# Patient Record
Sex: FEMALE | Race: WHITE | NOT HISPANIC OR LATINO | Employment: FULL TIME | URBAN - METROPOLITAN AREA
[De-identification: names, ages, dates, MRNs, and addresses within clinical notes are randomized per-mention and may not be internally consistent; named-entity substitution may affect disease eponyms.]

---

## 2020-11-03 ENCOUNTER — OFFICE VISIT (OUTPATIENT)
Dept: OBSTETRICS AND GYNECOLOGY | Facility: CLINIC | Age: 25
End: 2020-11-03

## 2020-11-03 VITALS
DIASTOLIC BLOOD PRESSURE: 90 MMHG | BODY MASS INDEX: 20.09 KG/M2 | TEMPERATURE: 97.5 F | SYSTOLIC BLOOD PRESSURE: 140 MMHG | WEIGHT: 125 LBS | HEIGHT: 66 IN

## 2020-11-03 DIAGNOSIS — Z00.00 ANNUAL PHYSICAL EXAM: Primary | ICD-10-CM

## 2020-11-03 PROCEDURE — 99395 PREV VISIT EST AGE 18-39: CPT | Performed by: NURSE PRACTITIONER

## 2020-11-03 RX ORDER — NORELGESTROMIN AND ETHINYL ESTRADIOL 150; 35 UG/D; UG/D
1 PATCH TRANSDERMAL WEEKLY
Qty: 3 PATCH | Refills: 12 | Status: SHIPPED | OUTPATIENT
Start: 2020-11-03 | End: 2021-01-20 | Stop reason: SDUPTHER

## 2020-11-03 RX ORDER — NORELGESTROMIN AND ETHINYL ESTRADIOL 150; 35 UG/D; UG/D
1 PATCH TRANSDERMAL WEEKLY
COMMUNITY
End: 2020-11-03

## 2020-11-03 NOTE — PROGRESS NOTES
GYN Annual Exam     CC - Here for annual exam.        HPI  Patricia Sotelo is a 25 y.o. female, No obstetric history on file., who presents for annual well woman exam. Patient's last menstrual period was 10/03/2020 (approximate)..  Periods are regular every 25-35 days, lasting 4 days. .  Dysmenorrhea:mild, occurring first 1-2 days of flow.  Patient reports problems with: none.  There were no changes to her medical or surgical history since her last visit.. Partner Status: Marital Status: single.  New Partners since last visit: no.  Desires STD Screening: no. The patient had an annual exam one year ago. Her last pap smear was one year ago and negative. She uses Xulane patch for birth control. She has no complaints today.    Additional OB/GYN History   Current contraception: contraceptive methods: xulane patch  Desires to: continue contraception  Last Pap : 2019 negative  Last Completed Pap Smear       Status Date      PAP SMEAR No completions recorded        History of abnormal Pap smear: no  Family history of uterine, colon, breast, or ovarian cancer: no  Performs monthly Self-Breast Exam: no  Exercises Regularly:yes  Feelings of Anxiety or Depression: no  Tobacco Usage?: No   OB History    No obstetric history on file.         Health Maintenance   Topic Date Due   • Annual Gynecologic Pelvic and Breast Exam  1995   • ANNUAL PHYSICAL  08/08/1998   • HPV VACCINES (1 - 2-dose series) 08/08/2006   • TDAP/TD VACCINES (1 - Tdap) 08/08/2014   • INFLUENZA VACCINE  08/01/2020   • HEPATITIS C SCREENING  09/02/2020   • PAP SMEAR  09/02/2020   • Pneumococcal Vaccine 0-64  Aged Out       The additional following portions of the patient's history were reviewed and updated as appropriate: allergies, current medications, past family history, past medical history, past social history, past surgical history and problem list.    Review of Systems   Constitutional: Negative.    Gastrointestinal: Negative.    Genitourinary:  "Negative.    Psychiatric/Behavioral: Negative.      All other systems reviewed and are negative.     I have reviewed and agree with the HPI, ROS, and historical information as entered above. RAGHAV Bowman    Objective   /90   Temp 97.5 °F (36.4 °C)   Ht 167.6 cm (66\")   Wt 56.7 kg (125 lb)   LMP 10/03/2020 (Approximate)   Breastfeeding No   BMI 20.18 kg/m²     Physical Exam  Vitals signs and nursing note reviewed. Exam conducted with a chaperone present.   Constitutional:       Appearance: Normal appearance.   Cardiovascular:      Rate and Rhythm: Normal rate and regular rhythm.   Chest:      Breasts:         Right: Normal.         Left: Normal.   Genitourinary:     General: Normal vulva.      Vagina: Normal.      Cervix: Normal.      Uterus: Normal.       Adnexa: Right adnexa normal and left adnexa normal.      Rectum: Normal.   Neurological:      Mental Status: She is alert.            Assessment and Plan    Problem List Items Addressed This Visit     None      Visit Diagnoses     Annual physical exam    -  Primary    Relevant Orders    Pap IG, Rfx HPV ASCU          1. GYN annual well woman exam.   2. Reviewed monthly self breast exams.  Instructed to call with lumps, pain, or breast discharge.    3. Reviewed exercise as a preventative health measures.   4. Reccommended Flu Vaccine in Fall of each year.  5. RTC in 1 year or PRN with problems   6. Doing well on Xulane patch. Rx refilled      RAGHAV Bowman  11/03/2020  "

## 2020-11-12 DIAGNOSIS — Z00.00 ANNUAL PHYSICAL EXAM: ICD-10-CM

## 2020-12-23 ENCOUNTER — OFFICE VISIT (OUTPATIENT)
Dept: OBSTETRICS AND GYNECOLOGY | Facility: CLINIC | Age: 25
End: 2020-12-23

## 2020-12-23 VITALS
BODY MASS INDEX: 19.84 KG/M2 | WEIGHT: 119.1 LBS | SYSTOLIC BLOOD PRESSURE: 116 MMHG | HEIGHT: 65 IN | DIASTOLIC BLOOD PRESSURE: 74 MMHG

## 2020-12-23 DIAGNOSIS — R87.612 LGSIL ON PAP SMEAR OF CERVIX: Primary | ICD-10-CM

## 2020-12-23 PROCEDURE — 57455 BIOPSY OF CERVIX W/SCOPE: CPT | Performed by: OBSTETRICS & GYNECOLOGY

## 2020-12-23 NOTE — PATIENT INSTRUCTIONS
Cervical Dysplasia    Cervical dysplasia is a condition in which a woman's cervix cells have abnormal changes. The cervix is the opening of the uterus (womb). It is located between the vagina and the uterus. Cervical dysplasia may be an early sign of cervical cancer.  If left untreated, this condition may become more severe and may progress to cervical cancer. Early detection, treatment, and follow-up care are very important.  What are the causes?  Cervical dysplasia can be caused by a human papillomavirus (HPV) infection. HPV is the most common sexually transmitted infection (STI). HPV is spread from person to person through sexual contact. This includes oral, vaginal, or anal sex.  What increases the risk?  The following factors may make you more likely to develop this condition:  · Having had a sexually transmitted infection (STI), such as herpes, chlamydia or HPV.  · Becoming sexually active before age 18.  · Having had more than one sexual partner.  · Having a sexual partner who has multiple sexual partners.  · Not using protection, such as a condom, during sex, especially with new sexual partners.  · Having a history of cancer of the vagina or vulva.  · Having a weakened body defense (immune) system.  · Being the daughter of a woman who took diethylstilbestrol (LELE) during pregnancy.  · Having a family history of cervical cancer.  · Smoking.  · Using oral contraceptives, also called birth control pills.  · Having had three or more full-term pregnancies.  What are the signs or symptoms?  There are usually no symptoms of this condition. If you do have symptoms, they may include:  · Abnormal vaginal discharge.  · Bleeding between periods or after sex.  · Bleeding during menopause.  · Pain during sex (dyspareunia).  How is this diagnosed?  A test called a Pap test may be done. During this test, cells are taken from the cervix and then examined under a microscope. A test in which tissue is removed from the cervix  (biopsy) may also be done if the Pap test is abnormal or if the cervix looks abnormal.  How is this treated?  Treatment varies based on the severity of the condition. Treatment may include:  · Cryotherapy. During cryotherapy, the abnormal cells are frozen with a steel-tip instrument.  · Loop electrosurgical excision procedure (LEEP). This procedure removes abnormal tissue from the cervix.  · Surgery to remove abnormal tissue. This is usually done in more advanced cases. Surgical options include:  ? A cone biopsy. This is a procedure in which the cervical canal and a portion of the center of the cervix are removed.  ? Hysterectomy. This is a surgery in which the uterus and cervix are removed.  Follow these instructions at home:  · Take over-the-counter and prescription medicines only as told by your health care provider.  · Do not use tampons, have sex, or douche until your health care provider says it is okay.  · Keep follow-up visits as told by your health care provider. This is important. Women who have been treated for cervical dysplasia should have regular pelvic exams and Pap tests as told by their health care provider.  How is this prevented?  · Practice safe sex to help prevent sexually transmitted infections (STI) that may cause this condition.  · Have regular Pap tests. Talk with your health care provider about how often you need these tests. Pap tests will help identify cell changes that can lead to cancer.  Contact a health care provider if:  · You develop genital warts.  Get help right away if:  · You have a fever.  · You have abnormal vaginal discharge.  · Your menstrual period is heavier than normal.  · You develop bright red bleeding. This may include blood clots.  · You have pain or cramps that get worse, and medicine does not help to relieve your pain.  · You feel light-headed and you are unusually weak.  · You have fainting spells.  · You have pain in the abdomen.  Summary  · Cervical dysplasia is  a condition in which a woman's cervix cells have abnormal changes.  · If left untreated, this condition may become more severe and may progress to cervical cancer.  · Early detection, treatment, and follow-up care are very important in managing this condition.  · Have regular pelvic exams and Pap tests. Talk with your health care provider about how often you need these tests. Pap tests will help identify cell changes that can lead to cancer.  This information is not intended to replace advice given to you by your health care provider. Make sure you discuss any questions you have with your health care provider.  Document Revised: 10/09/2019 Document Reviewed: 12/21/2017  Elsevier Patient Education © 2020 [employer]

## 2020-12-23 NOTE — PROGRESS NOTES
Colposcopy Procedure Note  Procedures    Indications: Patricia Sotelo is a 25 y.o. female, , whose LMP indicated follow up for evaluation of an abnormal papsmear presents today for colposcopy for evaluation of LSIL papsmear.  She understands the need for the procedure and is aware of the complications, including post-colposcopic vaginal bleeding, vaginal leukorrhea or cervicitis.  She is aware she may experience discomfort.  After being presented with the risk, benefits, and alternatives the patient wished to proceed.      Most recent Pap smear showed: low-grade squamous intraepithelial neoplasia (LGSIL - encompassing HPV,mild dysplasia,NAZ I).      Prior cervical/vaginal disease: normal exam without visible pathology.    Prior cervical treatment: no treatment.    Procedure Details   The risks and benefits of the procedure and Verbal informed consent obtained.  She was positioned in the dorsal lithotomy position and a speculum was inserted into the vagina and excellent visualization of cervix achieved, cervix swabbed x 3 with acetic acid solution and Lugol's solution. The transformation zone was completely visualized.  A cervical biopsy was obtained.  An endocervical curettage was not performed.  This colposcopy was satisfactory.     Findings:  The procedure was notable for:  Cervix: acetowhite lesion(s) noted at 10 o'clock; cervix swabbed with Lugol's solution, cervical biopsies taken at 10 o'clock, specimen labelled and sent to pathology and hemostasis achieved with Monsel's solution.  Vaginal inspection: vaginal colposcopy not performed.  Vulvar colposcopy: vulvar colposcopy not performed.    Specimens: cervical biopsy    Complications: none.      Plan:  1. Specimens labelled and sent to Pathology.  2. Will base further treatment on Pathology findings.  3. Repeat pap in 6 months    Wilder Betancourt MD  2020

## 2021-01-06 ENCOUNTER — TELEPHONE (OUTPATIENT)
Dept: OBSTETRICS AND GYNECOLOGY | Facility: CLINIC | Age: 26
End: 2021-01-06

## 2021-01-06 NOTE — TELEPHONE ENCOUNTER
Received biopsy results via fax : results stated LSIL , per [facility] repeat pap in 6 months.     S/w pt she v/u - scheduled 6 mo repeat pap appt.

## 2021-01-20 RX ORDER — NORELGESTROMIN AND ETHINYL ESTRADIOL 150; 35 UG/D; UG/D
1 PATCH TRANSDERMAL WEEKLY
Qty: 12 PATCH | Refills: 3 | Status: SHIPPED | OUTPATIENT
Start: 2021-01-20 | End: 2021-02-08 | Stop reason: SDUPTHER

## 2021-01-20 NOTE — TELEPHONE ENCOUNTER
Last appointment: 12/23/2020    [facility] is requesting 90 day fills be sent.

## 2021-01-26 RX ORDER — NORELGESTROMIN AND ETHINYL ESTRADIOL 150; 35 UG/D; UG/D
1 PATCH TRANSDERMAL WEEKLY
Qty: 12 PATCH | Refills: 3 | Status: CANCELLED | OUTPATIENT
Start: 2021-01-26

## 2021-01-26 RX ORDER — NORELGESTROMIN AND ETHINYL ESTRADIOL 150; 35 UG/D; UG/D
1 PATCH TRANSDERMAL WEEKLY
Qty: 9 PATCH | Refills: 3 | Status: CANCELLED | OUTPATIENT
Start: 2021-01-26 | End: 2021-04-20

## 2021-02-09 RX ORDER — NORELGESTROMIN AND ETHINYL ESTRADIOL 150; 35 UG/D; UG/D
1 PATCH TRANSDERMAL WEEKLY
Qty: 12 PATCH | Refills: 3 | Status: SHIPPED | OUTPATIENT
Start: 2021-02-09 | End: 2022-03-07 | Stop reason: SDUPTHER

## 2021-07-21 ENCOUNTER — OFFICE VISIT (OUTPATIENT)
Dept: OBSTETRICS AND GYNECOLOGY | Facility: CLINIC | Age: 26
End: 2021-07-21

## 2021-07-21 VITALS
WEIGHT: 115 LBS | BODY MASS INDEX: 18.05 KG/M2 | DIASTOLIC BLOOD PRESSURE: 64 MMHG | SYSTOLIC BLOOD PRESSURE: 102 MMHG | HEIGHT: 67 IN

## 2021-07-21 DIAGNOSIS — Z87.42 HISTORY OF ABNORMAL CERVICAL PAP SMEAR: Primary | ICD-10-CM

## 2021-07-21 PROCEDURE — 99212 OFFICE O/P EST SF 10 MIN: CPT | Performed by: OBSTETRICS & GYNECOLOGY

## 2021-07-21 NOTE — PATIENT INSTRUCTIONS
Cervical Dysplasia    Cervical dysplasia is a condition in which the cells in a woman's cervix have abnormal changes. The cervix is the opening of the uterus. It is located between the vagina and the uterus. Cervical dysplasia may be an early sign of cervical cancer.  If left untreated, this condition may become more severe and may progress to cervical cancer. Early detection, treatment, and follow-up care are very important.  What are the causes?  Cervical dysplasia is usually caused by a human papillomavirus (HPV) infection. HPV is spread from person to person through sexual contact. This includes oral, vaginal, or anal sex. HPV is the most common sexually transmitted infection (STI).  You are more likely to be exposed to HPV through sexual contact if:  · You have had more than one sexual partner or you have a sexual partner who has multiple sexual partners.  · You do not use protection such as a condom during sex, especially with new sexual partners.  What increases the risk?  The following factors may make you more likely to develop this condition:  · Having a family history of cervical cancer or a personal history of cancer of the vagina or vulva.  · Having had an STI, such as herpes, chlamydia, or gonorrhea.  · Becoming sexually active before age 18.  · Having a weakened disease-fighting system (immunesystem).  · Smoking.  · Being the daughter of a woman who took diethylstilbestrol (LELE), a synthetic estrogen, during pregnancy. Exposure during pregnancy may cause cervical abnormalities in the developing fetus.  What are the signs or symptoms?  There are usually no symptoms of this condition. If you do have symptoms, they may include:  · Abnormal vaginal discharge.  · Bleeding between periods or after sex.  · Bleeding during menopause.  · Pain during sex.  How is this diagnosed?  This condition may be diagnosed with a Pap test. During this test, cells are swabbed from the cervix and looked at under a  microscope.  If the Pap test is abnormal or if the cervix looks abnormal, you may also have a test in which a tissue sample is removed from the cervix and looked at under a microscope(biopsy).  How is this treated?  Treatment varies based on the severity of the condition. Treatment may include:  · Cryotherapy. During this therapy, the abnormal cells are frozen with a steel-tipped instrument.  · Loop electrosurgical excision procedure (LEEP). LEEP removes abnormal tissue from the cervix.  · Surgery to remove abnormal tissue. This is usually done in more severe cases. Options include:  ? A cone biopsy. This treatment removes the cervical canal and part of the center of the cervix.  ? Hysterectomy. This is a surgery in which the uterus and cervix are removed.  Follow these instructions at home:  · Take over-the-counter and prescription medicines only as told by your health care provider.  · Do not use tampons, have sex, or douche until your health care provider says it is safe.  · Keep all follow-up visits as told by your health care provider. This is important. Women who have been treated for cervical dysplasia should have regular pelvic exams and Pap tests as told by their health care provider.  How is this prevented?  · Practice safe sex to help prevent STIs.  · Have regular Pap tests. Talk with your health care provider about how often you need these tests. Pap tests will help identify cell changes that can lead to cancer.  · Ask your health care provider about possible vaccines to protect yourself against HPV.  Contact a health care provider if:  · You develop genital warts. The risk of cervical cancer is higher with certain types of HPV.  · Your menstrual period is heavier than normal or you develop bright red bleeding, which may include blood clots.  · You have abnormal vaginal discharge.  · You have a fever.  Get help right away if:  · You have pain or cramps in the abdomen that get worse, and medicine does not  help to relieve your pain.  · You feel light-headed and are unusually weak or pass out.  Summary  · Cervical dysplasia is a condition in which a woman's cervix cells have abnormal changes.  · If left untreated, this condition may become more severe and may progress to cervical cancer.  · Early detection, treatment, and follow-up care are very important in managing this condition.  · Have regular pelvic exams and Pap tests. Talk with your health care provider about how often you need these tests. Pap tests will help identify cell changes that can lead to cancer.  This information is not intended to replace advice given to you by your health care provider. Make sure you discuss any questions you have with your health care provider.  Document Revised: 01/25/2021 Document Reviewed: 01/25/2021  Elsevier Patient Education © 2021 [employer]

## 2021-07-21 NOTE — PROGRESS NOTES
Chief Complaint   Patient presents with   • Repeat Pap smear   Abnormal pap smear        Patricia Sotelo is a 25 y.o. year old  presenting to be seen for a PAP in follow-up of a prior abnormal PAP and/or HPV screen.    OTHER THINGS SHE WANTS TO DISCUSS TODAY:  Nothing else     The additional following portions of the patient's history were reviewed and updated as appropriate: allergies, current medications, past family history, past medical history, past social history, past surgical history and problem list.    Review of Systems   Constitutional: Negative for activity change, appetite change, unexpected weight gain and unexpected weight loss.   Eyes: Negative for visual disturbance.   Respiratory: Negative for cough and shortness of breath.    Cardiovascular: Negative for chest pain and palpitations.   Gastrointestinal: Negative for abdominal distention, abdominal pain, nausea and vomiting.   Genitourinary: Negative for menstrual problem and pelvic pain.   Musculoskeletal: Negative for back pain.   Neurological: Negative for light-headedness and headache.   Psychiatric/Behavioral: Negative for agitation, behavioral problems and depressed mood.     All other systems reviewed and are negative.     I have reviewed and agree with the HPI, ROS, and historical information as entered above. Wilder Betancourt MD    Physical Exam  Vitals reviewed. Exam conducted with a chaperone present.   Constitutional:       Appearance: She is normal weight.   Abdominal:      General: Abdomen is flat. Bowel sounds are normal.      Palpations: Abdomen is soft.   Genitourinary:     General: Normal vulva.      Vagina: Normal.      Cervix: Normal.      Uterus: Normal.       Adnexa: Right adnexa normal and left adnexa normal.      Rectum: Normal.   Skin:     General: Skin is warm and dry.   Neurological:      Mental Status: She is alert and oriented to person, place, and time.   Psychiatric:         Mood and Affect: Mood normal.          Behavior: Behavior normal.         Lab Review   Last PAP on 11/9/2020 indicated LSIL.   Her evaluation in the past has included a colposcopy on 12/23/2020.  The results of that were LSIL , recommended patient to have repeat papsmear done in 6 months. .    Assessment and Plan    Problem List Items Addressed This Visit     None      Visit Diagnoses     History of abnormal cervical Pap smear    -  Primary    Relevant Orders    Pap IG, HPV-hr          Follow up pap smear performed today. Plan on routine annual exam in 1 year unless abnormality seen today.     Wilder Betancourt MD  07/21/2021

## 2021-08-04 ENCOUNTER — TELEPHONE (OUTPATIENT)
Dept: OBSTETRICS AND GYNECOLOGY | Facility: CLINIC | Age: 26
End: 2021-08-04

## 2021-08-04 NOTE — TELEPHONE ENCOUNTER
Pt notified pap LSIL HPV+ non 16/18 needs Colpo, pt states she had Colpo 12/2020. I will need to confirm plan with Dr. Betancourt and call her back

## 2021-08-13 ENCOUNTER — OFFICE VISIT (OUTPATIENT)
Dept: OBSTETRICS AND GYNECOLOGY | Facility: CLINIC | Age: 26
End: 2021-08-13

## 2021-08-13 VITALS
HEIGHT: 67 IN | BODY MASS INDEX: 17.59 KG/M2 | DIASTOLIC BLOOD PRESSURE: 64 MMHG | WEIGHT: 112.06 LBS | SYSTOLIC BLOOD PRESSURE: 118 MMHG

## 2021-08-13 DIAGNOSIS — R87.612 LGSIL ON PAP SMEAR OF CERVIX: ICD-10-CM

## 2021-08-13 DIAGNOSIS — Z87.42 HISTORY OF ABNORMAL CERVICAL PAP SMEAR: Primary | ICD-10-CM

## 2021-08-13 LAB
B-HCG UR QL: NEGATIVE
INTERNAL NEGATIVE CONTROL: NORMAL
INTERNAL POSITIVE CONTROL: NORMAL
Lab: NORMAL

## 2021-08-13 PROCEDURE — 81025 URINE PREGNANCY TEST: CPT | Performed by: OBSTETRICS & GYNECOLOGY

## 2021-08-13 PROCEDURE — 57452 EXAM OF CERVIX W/SCOPE: CPT | Performed by: OBSTETRICS & GYNECOLOGY

## 2021-08-13 NOTE — PROGRESS NOTES
Colposcopy Procedure Note  Procedures    Indications: Patricia Sotelo is a 26 y.o. female, , whose LMP indicated follow up for evaluation of a abnormal pap smear presents today for colposcopy for evaluation of LSIL HPV + that was present on recent pap smear.  She understands the need for the procedure and is aware of the complications, including post-colposcopic vaginal bleeding, vaginal leukorrhea or cervicitis.  She is aware she may experience discomfort.  After being presented with the risk, benefits, and alternatives the patient wished to proceed.      Most recent Pap smear showed: low-grade squamous intraepithelial neoplasia (LGSIL - encompassing HPV,mild dysplasia,NAZ I) and HPV + (non 16/18)    Prior cervical/vaginal disease: NAZ 1.    Prior cervical treatment: previous colposcopy on 2020 followed by repeat pap smear on 2021..    Procedure Details   The risks and benefits of the procedure and Verbal informed consent obtained.  She was positioned in the dorsal lithotomy position and a speculum was inserted into the vagina and excellent visualization of cervix achieved, cervix swabbed x 3 with acetic acid solution and Lugol's solution. The transformation zone was completely visualized.  A cervical biopsy was not obtained.  An endocervical curettage was not performed.  This colposcopy was satisfactory.     Findings:  The procedure was notable for:  Cervix: no visible lesions  Vaginal inspection: normal without visible lesions.  Vulvar colposcopy: vulvar colposcopy not performed.    Specimens: none    Complications: none.      Plan:  1. No visible lesions seen on exam today. Will repeat pap smear in 1 year.     Wilder Betancourt MD  2021

## 2022-03-07 DIAGNOSIS — Z30.41 ENCOUNTER FOR SURVEILLANCE OF CONTRACEPTIVE PILLS: Primary | ICD-10-CM

## 2022-03-07 RX ORDER — NORELGESTROMIN AND ETHINYL ESTRADIOL 150; 35 UG/D; UG/D
1 PATCH TRANSDERMAL WEEKLY
Qty: 12 PATCH | Refills: 3 | Status: SHIPPED | OUTPATIENT
Start: 2022-03-07 | End: 2022-03-10 | Stop reason: SDUPTHER

## 2022-03-10 DIAGNOSIS — Z30.41 ENCOUNTER FOR SURVEILLANCE OF CONTRACEPTIVE PILLS: ICD-10-CM

## 2022-03-10 RX ORDER — NORELGESTROMIN AND ETHINYL ESTRADIOL 150; 35 UG/D; UG/D
1 PATCH TRANSDERMAL WEEKLY
Qty: 12 PATCH | Refills: 3 | Status: SHIPPED | OUTPATIENT
Start: 2022-03-10 | End: 2022-03-14 | Stop reason: SDUPTHER

## 2022-03-14 DIAGNOSIS — Z30.41 ENCOUNTER FOR SURVEILLANCE OF CONTRACEPTIVE PILLS: ICD-10-CM

## 2022-03-14 RX ORDER — NORELGESTROMIN AND ETHINYL ESTRADIOL 150; 35 UG/D; UG/D
1 PATCH TRANSDERMAL WEEKLY
Qty: 12 PATCH | Refills: 3 | Status: SHIPPED | OUTPATIENT
Start: 2022-03-14 | End: 2022-09-01 | Stop reason: SDUPTHER

## 2022-09-01 ENCOUNTER — OFFICE VISIT (OUTPATIENT)
Dept: OBSTETRICS AND GYNECOLOGY | Facility: CLINIC | Age: 27
End: 2022-09-01

## 2022-09-01 VITALS
WEIGHT: 113 LBS | HEIGHT: 67 IN | BODY MASS INDEX: 17.74 KG/M2 | SYSTOLIC BLOOD PRESSURE: 102 MMHG | DIASTOLIC BLOOD PRESSURE: 80 MMHG

## 2022-09-01 DIAGNOSIS — Z01.419 ROUTINE GYNECOLOGICAL EXAMINATION: Primary | ICD-10-CM

## 2022-09-01 DIAGNOSIS — Z30.41 ENCOUNTER FOR SURVEILLANCE OF CONTRACEPTIVE PILLS: ICD-10-CM

## 2022-09-01 DIAGNOSIS — Z01.419 PAP TEST, AS PART OF ROUTINE GYNECOLOGICAL EXAMINATION: ICD-10-CM

## 2022-09-01 PROCEDURE — 99395 PREV VISIT EST AGE 18-39: CPT | Performed by: NURSE PRACTITIONER

## 2022-09-01 RX ORDER — NORELGESTROMIN AND ETHINYL ESTRADIOL 150; 35 UG/D; UG/D
PATCH TRANSDERMAL
Qty: 9 PATCH | Refills: 3 | Status: SHIPPED | OUTPATIENT
Start: 2022-09-01

## 2022-09-01 NOTE — PROGRESS NOTES
Gynecologic Annual Exam Note        Gynecologic Exam        Subjective     HPI  Patricia Sotelo is a 27 y.o.  female who presents for annual well woman exam as a established patient. There were no changes to her medical or surgical history since her last visit.. Patient reports problems with: none. . Her periods are regular every 25-35 days, lasting 4 days. The flow is moderate. Dysmenorrhea:mild, occurring first 1-2 days of flow. . Partner Status: Marital Status: single.  She is sexually active. She has not had new partners.. STD testing recommendations have been explained to the patient and she does not desire STD testing.    Additional OB/GYN History   Current contraception: contraceptive methods: xulane  Desires to: continue contraception  Thromboembolic Disease: none  Age of menarche:     History of STD: no    Last Pap :2021. Results: LSIL. HPV: negative  Last Completed Pap Smear          Ordered - PAP SMEAR (Every 3 Years) Ordered on 2021  SCANNED - PAP SMEAR    2020  SCANNED - PAP SMEAR    2020  Pap IG, Rfx HPV ASCU                 History of abnormal Pap smear: yes - LSIL  and 2020 LSIL  Gardasil status:missed  Family history of uterine, colon, breast, or ovarian cancer: no  Performs monthly Self-Breast Exam: no  Exercises Regularly:yes  Feelings of Anxiety or Depression: no  Tobacco Usage?: No       Current Outpatient Medications:   •  diclofenac (VOLTAREN) 1 % gel gel, Apply 4 g topically to the appropriate area as directed 3 (Three) Times a Day., Disp: 100 g, Rfl: 0  •  norelgestromin-ethinyl estradiol (Xulane) 150-35 MCG/24HR, Place 1 patch on skin weekly  for 3 weeks, then leave off x 1 week, Disp: 9 patch, Rfl: 3     Patient is requesting refills of xulane.    OB History        0    Para   0    Term   0       0    AB   0    Living   0       SAB   0    IAB   0    Ectopic   0    Molar   0    Multiple   0    Live Births   0           "      Health Maintenance   Topic Date Due   • HEPATITIS C SCREENING  Never done   • COVID-19 Vaccine (3 - Booster for Pfizer series) 06/20/2021   • ANNUAL PHYSICAL  11/04/2021   • INFLUENZA VACCINE  10/01/2022   • Annual Gynecologic Pelvic and Breast Exam  09/02/2023   • PAP SMEAR  07/21/2024   • TDAP/TD VACCINES (2 - Td or Tdap) 08/31/2028   • Pneumococcal Vaccine 0-64  Aged Out       History reviewed. No pertinent past medical history.     Past Surgical History:   Procedure Laterality Date   • APPENDECTOMY         The additional following portions of the patient's history were reviewed and updated as appropriate: allergies and current medications.    Review of Systems   Constitutional: Negative.    Cardiovascular: Negative.    Gastrointestinal: Negative.    Genitourinary: Negative.    Psychiatric/Behavioral: Negative.          I have reviewed and agree with the HPI, ROS, and historical information as entered above. Rosemarie Tyson, APRN        Objective   /80   Ht 170.2 cm (67.01\")   Wt 51.3 kg (113 lb)   LMP 08/18/2022   BMI 17.69 kg/m²     Physical Exam  Vitals and nursing note reviewed. Exam conducted with a chaperone present.   Constitutional:       Appearance: She is well-developed.   HENT:      Head: Normocephalic and atraumatic.   Neck:      Thyroid: No thyroid mass or thyromegaly.   Cardiovascular:      Rate and Rhythm: Normal rate and regular rhythm.      Heart sounds: No murmur heard.  Pulmonary:      Effort: Pulmonary effort is normal. No retractions.      Breath sounds: Normal breath sounds. No wheezing, rhonchi or rales.   Chest:      Chest wall: No mass or tenderness.   Breasts:      Right: Normal. No mass, nipple discharge, skin change or tenderness.      Left: Normal. No mass, nipple discharge, skin change or tenderness.       Abdominal:      Palpations: Abdomen is soft. Abdomen is not rigid. There is no mass.      Tenderness: There is no abdominal tenderness. There is no guarding.      " Hernia: No hernia is present.   Genitourinary:     General: Normal vulva.      Labia:         Right: No rash, tenderness or lesion.         Left: No rash, tenderness or lesion.       Vagina: Normal. No vaginal discharge or lesions.      Cervix: No cervical motion tenderness, discharge, lesion or cervical bleeding.      Uterus: Normal. Not enlarged, not fixed and not tender.       Adnexa: Right adnexa normal and left adnexa normal.        Right: No mass or tenderness.          Left: No mass or tenderness.        Rectum: Normal. No external hemorrhoid.   Musculoskeletal:      Cervical back: Normal range of motion. No muscular tenderness.   Neurological:      Mental Status: She is alert and oriented to person, place, and time.   Psychiatric:         Behavior: Behavior normal.            Assessment and Plan    Problem List Items Addressed This Visit    None     Visit Diagnoses     Routine gynecological examination    -  Primary    Pap test, as part of routine gynecological examination        Relevant Orders    LIQUID-BASED PAP SMEAR, [facility] ([facility])    Encounter for surveillance of contraceptive pills        Relevant Medications    norelgestromin-ethinyl estradiol (Xulane) 150-35 MCG/24HR          1. GYN annual well woman exam.   2. Doing well on Xulane patches for contraception. Rx refilled.   3. Reviewed pap guidelines.   4. Reviewed monthly self breast exams.  Instructed to call with lumps, pain, or breast discharge.    5. Reviewed exercise as a preventative health measures.   6. Reccommended Flu Vaccine in Fall of each year.  7. RTC in 1 year or PRN with problems        Rosemarie Mehta, APRN  09/01/2022

## 2022-09-08 LAB — REF LAB TEST METHOD: NORMAL

## 2022-09-14 ENCOUNTER — TELEPHONE (OUTPATIENT)
Dept: OBSTETRICS AND GYNECOLOGY | Facility: CLINIC | Age: 27
End: 2022-09-14

## 2022-09-14 NOTE — TELEPHONE ENCOUNTER
Notified patient of pap smear results. Patient V/U. Appt scheduled. Patient reports that she has not had the gardasil vaccine. Patient would like to start series. Patient will come next week for first injection.

## 2023-04-17 DIAGNOSIS — Z30.41 ENCOUNTER FOR SURVEILLANCE OF CONTRACEPTIVE PILLS: ICD-10-CM

## 2023-04-17 RX ORDER — NORELGESTROMIN AND ETHINLY ESTRADIOL 150; 35 UG/D; UG/D
PATCH TRANSDERMAL
Qty: 9 PATCH | Refills: 1 | Status: SHIPPED | OUTPATIENT
Start: 2023-04-17

## 2023-07-28 ENCOUNTER — LAB (OUTPATIENT)
Dept: OBSTETRICS AND GYNECOLOGY | Facility: CLINIC | Age: 28
End: 2023-07-28
Payer: COMMERCIAL

## 2023-07-28 DIAGNOSIS — N92.6 MISSED PERIOD: Primary | ICD-10-CM

## 2023-07-28 LAB — HCG INTACT+B SERPL-ACNC: NORMAL MIU/ML

## 2023-07-31 NOTE — PROGRESS NOTES
There is a positive HCG with no note. Not sure if she is having problems. Can you call and set her up for a new OB